# Patient Record
(demographics unavailable — no encounter records)

---

## 2024-10-10 NOTE — HISTORY OF PRESENT ILLNESS
[FreeTextEntry1] : cc: diabetes  53-year-old man with T2DM, mildly uncontrolled.  Has had nagging headache for past 3 weeks.   Using [facility] CGM.  Values frequently high.  Was not able to get Mounjaro, still taking Trulicity 4.5. Taking Semglee 28, Humalog 18/12/11 before br/cassy/din and metformin 1000 mg BID.  Exercise- minimal, has had back pain for past 2 months Diet - Trying to limit carbohydrate intake as well as portion size.  Weight has been fluctuating Last optho visit was  3 weeks ago, no retinopathy.   HTN - blood pressure has been controlled, on losartan and carvedilol   Hypothyroidism - taking levothyroxine 75 mcg,  daily  Hyperlipidemia- taking atorvastatin 20.   has had long covid symptoms since covid infection 1.5 years ago

## 2024-10-10 NOTE — QUALITY MEASURES
[Visual inspection, sensory exam] : Foot exam, including visual inspection, sensory exam with mono filament, and pulse exam, was performed within the last 12 months [TextEntry] : foot exam 1/2024

## 2024-10-10 NOTE — PHYSICAL EXAM
[Alert] : alert [Well Nourished] : well nourished [No Acute Distress] : no acute distress [Normal Sclera/Conjunctiva] : normal sclera/conjunctiva [EOMI] : extra ocular movement intact [PERRL] : pupils equal, round and reactive to light [No Neck Mass] : no neck mass was observed [No LAD] : no lymphadenopathy [Thyroid Not Enlarged] : the thyroid was not enlarged [No Respiratory Distress] : no respiratory distress [Clear to Auscultation] : lungs were clear to auscultation bilaterally [Normal S1, S2] : normal S1 and S2 [Regular Rhythm] : with a regular rhythm [Normal Bowel Sounds] : normal bowel sounds [Not Tender] : non-tender [Soft] : abdomen soft [No Spinal Tenderness] : no spinal tenderness [No Involuntary Movements] : no involuntary movements were seen [Normal Strength/Tone] : muscle strength and tone were normal [Normal Reflexes] : deep tendon reflexes were 2+ and symmetric [No Tremors] : no tremors [Normal Affect] : the affect was normal [Normal Mood] : the mood was normal [de-identified] : + LE edema

## 2024-10-10 NOTE — ASSESSMENT
[FreeTextEntry1] : 53-year-old man with T2DM and obesity - CGm downloaded and reviewed. TIR 23%, 0% hypoglycemia. GMI  8.2%   Has fasting hyperglycemia.   Increase semglee to 38, continue current doses of prandial insulin - send download in 2 weeks.  - Continue Trulicity 4.5 mg.  and metformin - Encouraged pt to continue with diet and increase exercise - retinopathy screening up to date - check bmp - continue cgm  - Discussed bariatric surgery.  He will discuss with his wife and call back for referral if she is on board.   HTN: blood pressure at goal, c/w Losartan and Coreg. urine microalbumin neg  Hyperlipidemia - continue atorvastatin,   Hypothyroidism - clinically and biochemically euthyroid, continue levothyroxine,   f/u 4 months.